# Patient Record
Sex: MALE | Race: WHITE | NOT HISPANIC OR LATINO | URBAN - METROPOLITAN AREA
[De-identification: names, ages, dates, MRNs, and addresses within clinical notes are randomized per-mention and may not be internally consistent; named-entity substitution may affect disease eponyms.]

---

## 2024-10-24 ENCOUNTER — OFFICE VISIT (OUTPATIENT)
Dept: URGENT CARE | Facility: CLINIC | Age: 34
End: 2024-10-24
Payer: COMMERCIAL

## 2024-10-24 VITALS
TEMPERATURE: 98.3 F | HEART RATE: 77 BPM | WEIGHT: 184 LBS | DIASTOLIC BLOOD PRESSURE: 85 MMHG | SYSTOLIC BLOOD PRESSURE: 123 MMHG | OXYGEN SATURATION: 98 % | RESPIRATION RATE: 18 BRPM

## 2024-10-24 DIAGNOSIS — A08.4 VIRAL GASTROENTERITIS: Primary | ICD-10-CM

## 2024-10-24 PROCEDURE — 87811 SARS-COV-2 COVID19 W/OPTIC: CPT | Performed by: PHYSICIAN ASSISTANT

## 2024-10-24 PROCEDURE — S9083 URGENT CARE CENTER GLOBAL: HCPCS | Performed by: PHYSICIAN ASSISTANT

## 2024-10-24 PROCEDURE — G0383 LEV 4 HOSP TYPE B ED VISIT: HCPCS | Performed by: PHYSICIAN ASSISTANT

## 2024-10-24 RX ORDER — ROFLUMILAST 3 MG/G
CREAM TOPICAL
COMMUNITY
Start: 2024-08-27

## 2024-10-24 NOTE — PROGRESS NOTES
[facility]        NAME: Elmer Carrero is a 34 y.o. male  : 1990    MRN: [id number]  DATE: 2024  TIME: 6:37 PM    Assessment and Plan   Viral gastroenteritis [A08.4]  1. Viral gastroenteritis  Poct Covid 19 Rapid Antigen Test            Patient Instructions       Your rapid Covid was negative  Continue with Pepto Bis mal as needed  Increase fluid intake and remain well hydrated  Probiotics  BRAT diet     Follow up with PCP in 3-5 days.  Proceed to  ER if symptoms worsen.      Chief Complaint     Chief Complaint   Patient presents with    Cold Like Symptoms     Vomiting. Diarrhea, 2 days ao. Yesterday runny nose, Body aches, Headache.          History of Present Illness       Symptoms of URI associated with one-time vomiting and diarrhea which has resolved as per patient.  Patient use Pepto-Bismol.    URI   This is a new problem. The current episode started in the past 7 days. The problem has been gradually worsening. There has been no fever. Associated symptoms include congestion, coughing, diarrhea, headaches, rhinorrhea and vomiting. Pertinent negatives include no abdominal pain, nausea, plugged ear sensation, sinus pain, sore throat or wheezing. He has tried nothing for the symptoms. The treatment provided no relief.       Review of Systems   Review of Systems   Constitutional:  Positive for fatigue. Negative for activity change, appetite change, chills and fever.   HENT:  Positive for congestion and rhinorrhea. Negative for sinus pain and sore throat.    Respiratory:  Positive for cough. Negative for wheezing.    Gastrointestinal:  Positive for diarrhea and vomiting. Negative for abdominal pain and nausea.   Neurological:  Positive for headaches. Negative for weakness.         Current Medications       Current Outpatient Medications:     Zoryve 0.3 % CREA, apply by topical route every day to the affected area(s) (Patient not taking: Reported on 10/24/2024), Disp: , Rfl:     Current  Allergies     Allergies as of 10/24/2024 - Reviewed 10/24/2024   Allergen Reaction Noted    Bee venom Swelling 10/24/2024    Shellfish-derived products - food allergy Rash 10/24/2024            The following portions of the patient's history were reviewed and updated as appropriate: allergies, current medications, past family history, past medical history, past social history, past surgical history and problem list.     History reviewed. No pertinent past medical history.    History reviewed. No pertinent surgical history.    History reviewed. No pertinent family history.      Medications have been verified.        Objective   /85   Pulse 77   Temp 98.3 °F (36.8 °C)   Resp 18   Wt 83.5 kg (184 lb)   SpO2 98%        Physical Exam     Physical Exam  Vitals and nursing note reviewed.   Constitutional:       General: He is not in acute distress.     Appearance: Normal appearance. He is normal weight. He is not ill-appearing, toxic-appearing or diaphoretic.   HENT:      Right Ear: Tympanic membrane, ear canal and external ear normal.      Left Ear: Tympanic membrane, ear canal and external ear normal.      Nose: Congestion and rhinorrhea present.      Mouth/Throat:      Mouth: Mucous membranes are moist.      Pharynx: Oropharynx is clear. No oropharyngeal exudate.      Comments: Hyperemic posterior throat with clear postnasal drip.  Eyes:      General: No scleral icterus.     Extraocular Movements: Extraocular movements intact.      Conjunctiva/sclera: Conjunctivae normal.      Pupils: Pupils are equal, round, and reactive to light.   Cardiovascular:      Rate and Rhythm: Normal rate and regular rhythm.      Pulses: Normal pulses.      Heart sounds: Normal heart sounds.   Pulmonary:      Effort: Pulmonary effort is normal. No respiratory distress.      Breath sounds: Normal breath sounds.   Abdominal:      General: Abdomen is flat. Bowel sounds are normal.      Palpations: Abdomen is soft. There is no mass.       Tenderness: There is no abdominal tenderness. There is no guarding or rebound.   Musculoskeletal:         General: Normal range of motion.      Cervical back: Normal range of motion and neck supple. No tenderness.   Lymphadenopathy:      Cervical: No cervical adenopathy.   Skin:     General: Skin is warm and dry.      Findings: No rash.   Neurological:      General: No focal deficit present.      Mental Status: He is alert and oriented to person, place, and time.      Coordination: Coordination normal.      Gait: Gait normal.   Psychiatric:         Mood and Affect: Mood normal.         Behavior: Behavior normal.         Thought Content: Thought content normal.         Judgment: Judgment normal.

## 2024-10-24 NOTE — LETTER
October 24, 2024     Patient: Elmer Carrero   YOB: 1990   Date of Visit: 10/24/2024       To Whom it May Concern:    Elmer Carrero was seen in my clinic on 10/24/2024. He may return to work on 10/25/2024 .    If you have any questions or concerns, please don't hesitate to call.         Sincerely,          Erickson Pearce PA-C        CC:   No Recipients

## 2024-10-24 NOTE — PATIENT INSTRUCTIONS
"Patient Education    Your rapid Covid was negative  Continue with Pepto Bismal as needed  Increase fluid intake and remain well hydrated  Probiotics  BRAT diet     Follow up with PCP in 3-5 days.  Proceed to  ER if symptoms worsen.     Viral gastroenteritis in adults   The Basics   Written by the doctors and editors at [facility]   What is viral gastroenteritis? -- Viral gastroenteritis is an infection that can cause diarrhea and vomiting. It happens when a person's stomach and intestines get infected with a virus (figure 1). One of the most common causes of gastroenteritis is norovirus. But other viruses can cause it, too.  People can get viral gastroenteritis if they:   Touch an infected person or a surface with the virus on it, and then don't wash their hands   Eat foods or drink liquids with the virus in them. If people with the virus don't wash their hands, they can spread it to food or liquids they touch.  What are the symptoms of viral gastroenteritis? -- The infection causes diarrhea and vomiting. People can have either diarrhea or vomiting, or both. These symptoms usually start suddenly, and can be severe.  Viral gastroenteritis can also cause:   Fever   Headache or muscle aches   Belly pain or cramping   Loss of appetite  If you have a lot of diarrhea and vomiting, your body can lose too much water. This is known as \"dehydration.\" Dehydration can make you feel thirsty, tired, dizzy, or even confused. It can also make your urine look dark yellow.  Severe dehydration can be life-threatening. Older people are more likely to get severe dehydration.  Will I need tests? -- Not usually. Your doctor or nurse should be able to tell if you have viral gastroenteritis by learning about your symptoms and doing an exam. But the doctor or nurse might do tests to check for dehydration or to see which virus is causing the infection. These tests can include:   Blood tests   Urine tests   Tests on a sample of bowel " "movement  Is there anything I can do on my own to feel better? -- Yes. You need to replace the body's fluids that are lost through vomiting and diarrhea:   Drink fluids when you are able. It might help to take small sips every 15 to 30 minutes. Try to drink more as you start to feel better.   When you have a lot of vomiting or diarrhea, your body loses both water and salt. Drinking fluids that contain some salt can help replace what your body has lost. Examples include \"oral rehydration solutions,\" sports drinks, and broth. If you drink a lot of plain water, make sure you are also eating. This will help your body keep the right salt and water balance.   Avoid drinks with a lot of sugar, like juice or soda. Avoid alcohol, too.   Eat when you are able. If you can keep food down, it's best to eat lean meats, fruits, vegetables, and whole-grain breads and cereals. Avoid eating foods with a lot of fat or sugar, which can make symptoms worse.  If you are an adult younger than 65 and you have a new bout of diarrhea, and no fever and no blood in your bowel movements, you can take medicine to stop diarrhea such as loperamide (brand name: Imodium) for 1 to 2 days. But if you are older than 65, have a fever, or have blood in your bowel movements, do not take these medicines without checking with your doctor.  If you have diabetes, you might need to check your blood sugar more often until you feel better. Ask your doctor or nurse about this.  Should I call the doctor or nurse? -- Call the doctor or nurse if you:   Have any symptoms of dehydration, like feeling very tired, thirsty, dizzy, or confused   Have diarrhea or vomiting that lasts longer than a few days   Vomit up blood, have bloody diarrhea, or have severe belly pain   Haven't been able to drink anything for many hours   Haven't needed to urinate in the past 6 to 8 hours (during the day)  How is viral gastroenteritis treated? -- Most people do not need any treatment, " "because their symptoms will get better on their own. But people with severe dehydration might need treatment in the hospital. This involves getting fluids through a thin tube that goes into a vein, called an \"IV.\"  Doctors do not treat viral gastroenteritis with antibiotics. That's because antibiotics treat infections that are caused by bacteria, not viruses.  Can viral gastroenteritis be prevented? -- Sometimes. To lower the chance of getting or spreading the infection, wash your hands well with soap and water:   After you use the bathroom   Before you eat   Before you prepare food  Also, if you are caring for a child in diapers, wash your hands well after changing diapers. Do not change diapers near where you cook or eat food.  All topics are updated as new evidence becomes available and our peer review process is complete.  This topic retrieved from [employer] on: Mar 06, 2024.  Topic 23789 Version 17.0  Release: 32.2.4 - C32.64  © 2024 UpToDate, Inc. and/or its affiliates. All rights reserved.  figure 1: Digestive system     This drawing shows the organs in the body that process food. Together, these organs are called the \"digestive system\" or \"digestive tract.\" As food travels through this system, the body absorbs nutrients and water.  Graphic 34621 Version 5.0  Consumer Information Use and Disclaimer   Disclaimer: This generalized information is a limited summary of diagnosis, treatment, and/or medication information. It is not meant to be comprehensive and should be used as a tool to help the user understand and/or assess potential diagnostic and treatment options. It does NOT include all information about conditions, treatments, medications, side effects, or risks that may apply to a specific patient. It is not intended to be medical advice or a substitute for the medical advice, diagnosis, or treatment of a health care provider based on the health care provider's examination and assessment of a patient's " specific and unique circumstances. Patients must speak with a health care provider for complete information about their health, medical questions, and treatment options, including any risks or benefits regarding use of medications. This information does not endorse any treatments or medications as safe, effective, or approved for treating a specific patient. UpToDate, Inc. and its affiliates disclaim any warranty or liability relating to this information or the use thereof.The use of this information is governed by the Terms of Use, available at https://www.woltersPalringouwer.com/en/know/clinical-effectiveness-terms. 2024© UpToDate, Inc. and its affiliates and/or licensors. All rights reserved.  Copyright   © 2024 UpToDate, Inc. and/or its affiliates. All rights reserved.

## 2024-10-28 LAB
SARS-COV-2 AG UPPER RESP QL IA: NEGATIVE
VALID CONTROL: NORMAL

## 2024-11-12 ENCOUNTER — HOSPITAL ENCOUNTER (EMERGENCY)
Facility: HOSPITAL | Age: 34
Discharge: HOME | End: 2024-11-12
Attending: EMERGENCY MEDICINE
Payer: OTHER MISCELLANEOUS

## 2024-11-12 ENCOUNTER — OFFICE VISIT (OUTPATIENT)
Dept: URGENT CARE | Facility: CLINIC | Age: 34
End: 2024-11-12
Payer: COMMERCIAL

## 2024-11-12 VITALS
DIASTOLIC BLOOD PRESSURE: 76 MMHG | RESPIRATION RATE: 24 BRPM | TEMPERATURE: 96.4 F | OXYGEN SATURATION: 97 % | SYSTOLIC BLOOD PRESSURE: 144 MMHG | HEART RATE: 89 BPM

## 2024-11-12 VITALS
HEART RATE: 85 BPM | OXYGEN SATURATION: 96 % | SYSTOLIC BLOOD PRESSURE: 151 MMHG | RESPIRATION RATE: 20 BRPM | WEIGHT: 184 LBS | TEMPERATURE: 97.4 F | DIASTOLIC BLOOD PRESSURE: 86 MMHG

## 2024-11-12 DIAGNOSIS — T78.40XA ALLERGIC REACTION, INITIAL ENCOUNTER: Primary | ICD-10-CM

## 2024-11-12 DIAGNOSIS — R06.2 WHEEZING: ICD-10-CM

## 2024-11-12 PROCEDURE — 96372 THER/PROPH/DIAG INJ SC/IM: CPT | Performed by: PHYSICIAN ASSISTANT

## 2024-11-12 PROCEDURE — G0381 LEV 2 HOSP TYPE B ED VISIT: HCPCS | Performed by: PHYSICIAN ASSISTANT

## 2024-11-12 PROCEDURE — S9083 URGENT CARE CENTER GLOBAL: HCPCS | Performed by: PHYSICIAN ASSISTANT

## 2024-11-12 PROCEDURE — 99284 EMERGENCY DEPT VISIT MOD MDM: CPT | Performed by: EMERGENCY MEDICINE

## 2024-11-12 PROCEDURE — 99284 EMERGENCY DEPT VISIT MOD MDM: CPT

## 2024-11-12 RX ORDER — PREDNISONE 20 MG/1
40 TABLET ORAL DAILY
Qty: 10 TABLET | Refills: 0 | Status: SHIPPED | OUTPATIENT
Start: 2024-11-13

## 2024-11-12 RX ORDER — EPINEPHRINE 0.3 MG/.3ML
0.3 INJECTION SUBCUTANEOUS ONCE
Qty: 0.6 ML | Refills: 0 | Status: SHIPPED | OUTPATIENT
Start: 2024-11-12 | End: 2024-11-12

## 2024-11-12 RX ORDER — DIPHENHYDRAMINE HCL 25 MG
50 TABLET ORAL ONCE
Status: COMPLETED | OUTPATIENT
Start: 2024-11-12 | End: 2024-11-12

## 2024-11-12 RX ORDER — DEXAMETHASONE SODIUM PHOSPHATE 4 MG/ML
8 INJECTION, SOLUTION INTRA-ARTICULAR; INTRALESIONAL; INTRAMUSCULAR; INTRAVENOUS; SOFT TISSUE ONCE
Status: COMPLETED | OUTPATIENT
Start: 2024-11-12 | End: 2024-11-12

## 2024-11-12 RX ADMIN — Medication 50 MG: at 09:07

## 2024-11-12 RX ADMIN — DEXAMETHASONE SODIUM PHOSPHATE 8 MG: 4 INJECTION, SOLUTION INTRA-ARTICULAR; INTRALESIONAL; INTRAMUSCULAR; INTRAVENOUS; SOFT TISSUE at 09:07

## 2024-11-12 NOTE — PATIENT INSTRUCTIONS
EMS was alerted patient appears to be stable.  Benadryl and dexamethasone given in the office.  Recommended patient be seen at the ER for further evaluation and monitoring.    Follow up with PCP in 3-5 days.  Proceed to  ER if symptoms worsen.    If tests are performed, our office will contact you with results only if changes need to made to the care plan discussed with you at the visit. You can review your full results on [facility].

## 2024-11-12 NOTE — PROGRESS NOTES
[facility]        NAME: Elmer Carrero is a 34 y.o. male  : 1990    MRN: [id number]  DATE: 2024  TIME: 9:13 AM    Assessment and Plan   Allergic reaction, initial encounter [T78.40XA]  1. Allergic reaction, initial encounter  dexamethasone (DECADRON) injection 8 mg    diphenhydrAMINE (BENADRYL) tablet 50 mg    Transfer to other facility      2. Wheezing              Patient Instructions     Patient Instructions   EMS was alerted patient appears to be stable.  Benadryl and dexamethasone given in the office.  Recommended patient be seen at the ER for further evaluation and monitoring.    Follow up with PCP in 3-5 days.  Proceed to  ER if symptoms worsen.    If tests are performed, our office will contact you with results only if changes need to made to the care plan discussed with you at the visit. [facility]        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 Was only in the room with it. Only had rash last time. Can't catch breath light headed, dizzy.         History of Present Illness       Presents today for evaluation of an allergic reaction.  He states he has had a rash from shellfish before but today when they were making crab cakes at work he started to have difficulty breathing and feeling tightness in his chest.  Did not take any medication.    Allergic Reaction  This is a new problem. The current episode started today. The problem is unchanged. The problem is moderate. The patient was exposed to shellfish. The time of exposure was just prior to onset. The exposure occurred at Work. Associated symptoms include difficulty breathing and wheezing. Pertinent negatives include no globus sensation, hyperventilation, itching, rash or trouble swallowing. There is no swelling present. Past treatments include nothing.       Review of Systems   Review of Systems    HENT:  Negative for trouble swallowing.    Respiratory:  Positive for wheezing.    Skin:  Negative for itching and rash.   All other systems reviewed and are negative.        Current Medications       Current Outpatient Medications:     Zoryve 0.3 % CREA, , Disp: , Rfl:   No current facility-administered medications for this visit.    Current Allergies     Allergies as of 11/12/2024 - Reviewed 11/12/2024   Allergen Reaction Noted    Bee venom Swelling 10/24/2024    Shellfish-derived products - food allergy Rash 10/24/2024            The following portions of the patient's history were reviewed and updated as appropriate: allergies, current medications, past family history, past medical history, past social history, past surgical history and problem list.     History reviewed. No pertinent past medical history.    History reviewed. No pertinent surgical history.    History reviewed. No pertinent family history.      Medications have been verified.        Objective   /76   Pulse 89   Temp (!) 96.4 °F (35.8 °C)   Resp (!) 24   SpO2 97%        Physical Exam     Physical Exam  Vitals and nursing note reviewed.   Constitutional:       Appearance: Normal appearance.   HENT:      Mouth/Throat:      Mouth: Mucous membranes are moist.      Pharynx: Oropharynx is clear.      Comments: No obvious swelling.  Pulmonary:      Effort: Pulmonary effort is normal.      Breath sounds: Wheezing (Significant in the upper lung fields) present.      Comments: Breathing appears to be slightly labored.  Skin:     General: Skin is warm.   Neurological:      General: No focal deficit present.      Mental Status: He is alert and oriented to person, place, and time.   Psychiatric:         Mood and Affect: Mood normal.         Behavior: Behavior normal.

## 2024-11-12 NOTE — ED PROVIDER NOTES
Time reflects when diagnosis was documented in both MDM as applicable and the Disposition within this note       Time User Action Codes Description Comment    11/12/2024  9:45 AM Mat Jeff Add [T78.40XA] Allergic reaction, initial encounter           ED Disposition       ED Disposition   Discharge    Condition   Stable    Date/Time   Tue Nov 12, 2024  9:59 AM    Comment   Elmer Carrero discharge to home/self care.                   Assessment & Plan       Medical Decision Making  Problems Addressed:  Allergic reaction, initial encounter: complicated acute illness or injury with systemic symptoms that poses a threat to life or bodily functions     Details: Ddx includes allergic reaction, anaphylaxis, angioedema, CHF, asthma, etc.     Risk  Prescription drug management.             Medications - No data to display    ED Risk Strat Scores                                               History of Present Illness       Chief Complaint   Patient presents with    Allergic Reaction     Pt arrived via EMS from . Pt is allergic to seafood/shellfish. Pt is a  and was making crab cakes. Pt began having trouble breathing. Pt received benadryl, decadron and albuterol en route.        Past Medical History:   Diagnosis Date    IBS (irritable bowel syndrome)     Testicular cancer (HCC)       Past Surgical History:   Procedure Laterality Date    SURGERY SCROTAL / TESTICULAR Left     testicular removal      History reviewed. No pertinent family history.   Social History     Tobacco Use    Smoking status: Every Day     Types: Cigarettes     Start date: 2012    Smokeless tobacco: Never   Vaping Use    Vaping status: Never Used   Substance Use Topics    Alcohol use: Not Currently    Drug use: Not Currently      E-Cigarette/Vaping    E-Cigarette Use Never User       E-Cigarette/Vaping Substances      I have reviewed and agree with the history as documented.     33 yo male with h/o allergy to seafood/shellfish who presents to ED  with acute allergic reaction. 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 Went to  and received IM decadron and PO benadryl. EMS administered duoneb for wheezing which is now resolved and pt notes resolution of all sxs at the time of ED arrival. No h/o anaphylaxis. No prior epi use or epi today. No abd pain, vomiting or diarrhea. No rash or hives.         Review of Systems   Respiratory:  Positive for shortness of breath and wheezing.            Objective       ED Triage Vitals [11/12/24 0943]   Temperature Pulse Blood Pressure Respirations SpO2 Patient Position - Orthostatic VS   (!) 97.4 °F (36.3 °C) 85 151/86 20 96 % --      Temp Source Heart Rate Source BP Location FiO2 (%) Pain Score    Oral -- -- -- --      Vitals      Date and Time Temp Pulse SpO2 Resp BP Pain Score FACES Pain Rating User   11/12/24 0943 97.4 °F (36.3 °C) 85 96 % 20 151/86 -- -- EN            Physical Exam  Vitals and nursing note reviewed.   Constitutional:       General: He is not in acute distress.     Appearance: Normal appearance. He is well-developed. He is not ill-appearing, toxic-appearing or diaphoretic.   HENT:      Head: Normocephalic and atraumatic.      Mouth/Throat:      Mouth: Mucous membranes are moist.      Pharynx: Oropharynx is clear.   Eyes:      Conjunctiva/sclera: Conjunctivae normal.      Pupils: Pupils are equal, round, and reactive to light.   Neck:      Vascular: No JVD.   Cardiovascular:      Rate and Rhythm: Normal rate and regular rhythm.      Pulses: Normal pulses.      Heart sounds: Normal heart sounds. No murmur heard.     No friction rub. No gallop.   Pulmonary:      Effort: Pulmonary effort is normal. No respiratory distress.      Breath sounds: Normal breath sounds. No stridor. No wheezing or rales.   Abdominal:      General: There is no distension.      Palpations: Abdomen is soft.      Tenderness: There is no abdominal tenderness. There is  no guarding or rebound.   Musculoskeletal:         General: No swelling, tenderness, deformity or signs of injury. Normal range of motion.      Cervical back: Normal range of motion and neck supple. No rigidity.   Skin:     General: Skin is warm and dry.      Capillary Refill: Capillary refill takes less than 2 seconds.      Coloration: Skin is not jaundiced or pale.      Findings: No bruising, erythema or rash.   Neurological:      General: No focal deficit present.      Mental Status: He is alert and oriented to person, place, and time.      Cranial Nerves: No cranial nerve deficit.      Sensory: No sensory deficit.      Motor: No weakness or abnormal muscle tone.      Coordination: Coordination normal.      Gait: Gait normal.         Results Reviewed       None            No orders to display       Procedures    ED Medication and Procedure Management   Prior to Admission Medications   Prescriptions Last Dose Informant Patient Reported? Taking?   Zoryve 0.3 % CREA   Yes No      Facility-Administered Medications Last Administration Doses Remaining   dexamethasone (DECADRON) injection 8 mg 11/12/2024  9:07 AM 0   diphenhydrAMINE (BENADRYL) tablet 50 mg 11/12/2024  9:07 AM 0        Discharge Medication List as of 11/12/2024  9:59 AM        START taking these medications    Details   predniSONE 20 mg tablet Take 2 tablets (40 mg total) by mouth daily Do not start before November 13, 2024., Starting Wed 11/13/2024, Normal           CONTINUE these medications which have NOT CHANGED    Details   Zoryve 0.3 % CREA Historical Med             ED SEPSIS DOCUMENTATION   Time reflects when diagnosis was documented in both MDM as applicable and the Disposition within this note       Time User Action Codes Description Comment    11/12/2024  9:45 AM Mat Jeff Add [T78.40XA] Allergic reaction, initial encounter                  Mat Jeff MD  11/12/24 9512

## 2024-11-12 NOTE — Clinical Note
Elmer Carrero was seen and treated in our emergency department on 11/12/2024.                Diagnosis:     Elmer  may return to work on return date.    He may return on this date: 11/13/2024         If you have any questions or concerns, please don't hesitate to call.      Mat Jeff MD    ______________________________           _______________          _______________  Hospital Representative                              Date                                Time

## 2024-11-25 ENCOUNTER — HOSPITAL ENCOUNTER (EMERGENCY)
Facility: HOSPITAL | Age: 34
Discharge: HOME | End: 2024-11-25
Attending: EMERGENCY MEDICINE
Payer: OTHER MISCELLANEOUS

## 2024-11-25 VITALS
RESPIRATION RATE: 22 BRPM | TEMPERATURE: 98.7 F | OXYGEN SATURATION: 97 % | HEART RATE: 65 BPM | DIASTOLIC BLOOD PRESSURE: 60 MMHG | SYSTOLIC BLOOD PRESSURE: 112 MMHG

## 2024-11-25 DIAGNOSIS — T78.40XA ALLERGIC REACTION: Primary | ICD-10-CM

## 2024-11-25 PROCEDURE — 99282 EMERGENCY DEPT VISIT SF MDM: CPT

## 2024-11-25 PROCEDURE — 99284 EMERGENCY DEPT VISIT MOD MDM: CPT | Performed by: PHYSICIAN ASSISTANT

## 2024-11-25 RX ORDER — PREDNISONE 50 MG/1
50 TABLET ORAL DAILY
Qty: 5 TABLET | Refills: 0 | Status: SHIPPED | OUTPATIENT
Start: 2024-11-25 | End: 2024-12-05

## 2024-11-25 RX ORDER — CETIRIZINE HYDROCHLORIDE 10 MG/1
10 TABLET ORAL DAILY
Qty: 10 TABLET | Refills: 0 | Status: SHIPPED | OUTPATIENT
Start: 2024-11-25

## 2024-11-25 RX ORDER — FAMOTIDINE 20 MG/1
20 TABLET, FILM COATED ORAL ONCE
Status: COMPLETED | OUTPATIENT
Start: 2024-11-25 | End: 2024-11-25

## 2024-11-25 RX ORDER — EPINEPHRINE 0.3 MG/.3ML
0.3 INJECTION SUBCUTANEOUS ONCE
Qty: 0.6 ML | Refills: 0 | Status: SHIPPED | OUTPATIENT
Start: 2024-11-25 | End: 2024-12-03 | Stop reason: SDUPTHER

## 2024-11-25 RX ADMIN — PREDNISONE 50 MG: 20 TABLET ORAL at 11:21

## 2024-11-25 RX ADMIN — FAMOTIDINE 20 MG: 20 TABLET, FILM COATED ORAL at 11:21

## 2024-11-25 NOTE — Clinical Note
Elmer Carrero was seen and treated in our emergency department on 11/25/2024.    No restrictions            Diagnosis:     Elmer  may return to work on return date.    He may return on this date: 11/27/2024         If you have any questions or concerns, please don't hesitate to call.      Julie Lynn Gutzweiler, PA-C    ______________________________           _______________          _______________  Hospital Representative                              Date                                Time

## 2024-11-25 NOTE — ED PROVIDER NOTES
Time reflects when diagnosis was documented in both MDM as applicable and the Disposition within this note       Time User Action Codes Description Comment    11/25/2024 11:39 AM Gutzweiler, Julie Add [T78.40XA] Allergic reaction     11/25/2024 11:39 AM Gutzweiler, Julie Modify [T78.40XA] Allergic reaction Shell fish          ED Disposition       ED Disposition   Discharge    Condition   Stable    Date/Time   Mon Nov 25, 2024 11:39 AM    Comment   Elmer Carrero discharge to home/self care.                   Assessment & Plan       Medical Decision Making  This 34-year-old male presents emergency room by frederick.  He works in the kitchen at a restaurant.  They were frying some shrimp and he suddenly felt like he could not catch his breath.  He felt like his voice was hoarse.  He states that he was guppy breathing.  He had to leave the kitchen when he got outside he was feeling better.  He took 50 mg of Benadryl and states he felt a little lightheaded shortly after taking the medications.  He called the emergency squad instead of driving his car.  Presently he is feeling better.  He denies any shortness of breath at this time.  He is able to handle his secretions.  He is not having any difficulty swallowing.  He denies any wheezing.  He denies any chest pain.  He denies a sore throat.  He denies any skin rash or itching.  He states that the paramedic told him he was having a panic attack.  He had a similar episode last week when he was working with shellfish.  Treated in the emergency room and released.  He was given an EpiPen at that time that he never got filled.    Upon physical exam this 34-year-old male is alert oriented x 3.  He is in no acute distress.  He speaking in full sentences.  Handling his secretions.  His airway is patent with no edema.  There is no erythema present.  He has no rhinorrhea or postnasal drip.  His neck is supple upon palpation without lymphadenopathy or nuchal rigidity.  His lungs are  clear to auscultation.  His heart is a regular rate and rhythm without murmur.  His abdomen is soft nondistended nontender without guarding or rebound.  He has no dermatitis identified on exposed skin.    Patient will be placed on the monitor.  He will be observed in the emergency room for 90 minutes.  He will be given a dose of 50 mg of prednisone along with the 50 mg Benadryl that he already took.  He also will be given 20 mg of Pepcid.  At this time he does not require IM epinephrine.    Patient was observed for 2 hours.  He is asymptomatic.  He has no dermatitis.  He has no angioedema.  He has no swelling of his posterior pharynx.  His airway is intact.  He is handling his secretions.  Speaking in full sentences.  His lungs are clear to auscultation.    Impression  Allergic reaction-seafood    Plan  Patient's to get his EpiPen filled.  I wrote for a refill prescription so he has to at home.  Instructed him on the use if his symptoms return.  He is to avoid working around shellfish.  He is going to follow-up with his family physician for recheck exam and to be referred to an allergist.  Patient understands the use of the EpiPen.  I reviewed this with him in the emergency room today.  If he has repeat symptoms, he is to use the EpiPen IM and come immediately to the emergency room either with 911 or via car as a passenger.  He should not be driving after the epinephrine administration.      Risk  OTC drugs.  Prescription drug management.        ED Course as of 11/25/24 1614   Mon Nov 25, 2024   1138 Recheck patient.  Patient feels better.  Plan DC home.  Prednisone x 5 days.  Zyrtec 10 mg per day x 5 days.  Benadryl 25 mg every 6 hours as needed for breakthrough pain.       Medications   predniSONE tablet 50 mg (50 mg Oral Given 11/25/24 1121)   famotidine (PEPCID) tablet 20 mg (20 mg Oral Given 11/25/24 1121)       ED Risk Strat Scores                                               History of Present Illness  "      Chief Complaint   Patient presents with    Allergic Reaction     BIBA from work. Pt. States: \"He felt tightness on his throat after going into the kitchen, they were cooking shrimp that time. \" Pt. Self medicated, took Benadryl 50 mg.  Symptoms resolved, breathing is better.       Past Medical History:   Diagnosis Date    IBS (irritable bowel syndrome)     Testicular cancer (HCC)       Past Surgical History:   Procedure Laterality Date    SURGERY SCROTAL / TESTICULAR Left     testicular removal      No family history on file.   Social History     Tobacco Use    Smoking status: Every Day     Types: Cigarettes     Start date: 2012    Smokeless tobacco: Never   Vaping Use    Vaping status: Never Used   Substance Use Topics    Alcohol use: Not Currently    Drug use: Not Currently      E-Cigarette/Vaping    E-Cigarette Use Never User       E-Cigarette/Vaping Substances      I have reviewed and agree with the history as documented.       History provided by:  Patient  Allergic Reaction  Presenting symptoms: difficulty breathing (1) and swelling    Presenting symptoms: no difficulty swallowing, no itching, no rash and no wheezing    Difficulty breathing:     Severity:  Moderate    Onset quality:  Gradual    Duration:  1 hour    Timing:  Intermittent    Progression:  Resolved  Severity:  Mild  Duration:  1 hour  Prior episodes: Shellfish allergy test patient seen on November 12, 2024 for similar episode.  Context comment:  Shellfish  Relieved by:  Antihistamines (50 mg of Benadryl and leaving the kitchen.)  Worsened by:  Nothing      Review of Systems   Constitutional:  Positive for activity change. Negative for appetite change, chills, fatigue and fever.   HENT:  Positive for voice change. Negative for congestion, postnasal drip, rhinorrhea, sore throat and trouble swallowing.    Respiratory:  Positive for shortness of breath. Negative for chest tightness and wheezing.    Cardiovascular:  Negative for chest pain and " palpitations.   Gastrointestinal:  Negative for abdominal pain, nausea and vomiting.   Skin:  Negative for color change, itching and rash.   Psychiatric/Behavioral:  Negative for confusion.    All other systems reviewed and are negative.          Objective       ED Triage Vitals   Temperature Pulse Blood Pressure Respirations SpO2 Patient Position - Orthostatic VS   11/25/24 0959 11/25/24 0959 11/25/24 0959 11/25/24 0959 11/25/24 1000 11/25/24 1000   98 °F (36.7 °C) 93 135/93 12 96 % Lying      Temp Source Heart Rate Source BP Location FiO2 (%) Pain Score    11/25/24 0959 11/25/24 0959 11/25/24 1100 -- --    Oral Monitor Right arm        Vitals      Date and Time Temp Pulse SpO2 Resp BP Pain Score FACES Pain Rating User   11/25/24 1130 -- 65 97 % 22 112/60 -- -- AA   11/25/24 1100 -- 77 98 % 18 136/82 -- -- AA   11/25/24 1000 -- 93 Simultaneous filing. User may not have seen previous data. 96 % Simultaneous filing. User may not have seen previous data. 12 135/93 -- -- AA   11/25/24 1000 98.7 °F (37.1 °C) -- -- -- -- -- -- LM   11/25/24 0959 98 °F (36.7 °C) 93 -- 12 135/93 -- -- LM            Physical Exam  Vitals and nursing note reviewed.   Constitutional:       General: He is not in acute distress.     Appearance: Normal appearance. He is normal weight. He is not ill-appearing, toxic-appearing or diaphoretic.   HENT:      Head: Normocephalic and atraumatic.      Right Ear: External ear normal.      Left Ear: External ear normal.      Nose: Nose normal.      Mouth/Throat:      Pharynx: No oropharyngeal exudate or posterior oropharyngeal erythema.      Comments: No edema, airway patent  Eyes:      General:         Right eye: No discharge.         Left eye: No discharge.      Conjunctiva/sclera: Conjunctivae normal.   Cardiovascular:      Rate and Rhythm: Normal rate and regular rhythm.      Heart sounds: Normal heart sounds.   Pulmonary:      Effort: Pulmonary effort is normal.      Breath sounds: Normal breath  sounds.   Abdominal:      General: There is no distension.      Palpations: Abdomen is soft.      Tenderness: There is no abdominal tenderness. There is no guarding or rebound.   Musculoskeletal:      Cervical back: Neck supple. No rigidity.      Right lower leg: No edema.      Left lower leg: No edema.   Lymphadenopathy:      Cervical: No cervical adenopathy.   Skin:     General: Skin is warm.      Capillary Refill: Capillary refill takes less than 2 seconds.      Findings: No rash.   Neurological:      Mental Status: He is alert and oriented to person, place, and time.   Psychiatric:         Mood and Affect: Mood normal.         Behavior: Behavior normal.         Thought Content: Thought content normal.         Judgment: Judgment normal.         Results Reviewed       None            No orders to display       Procedures    ED Medication and Procedure Management   Prior to Admission Medications   Prescriptions Last Dose Informant Patient Reported? Taking?   EPINEPHrine (EPIPEN) 0.3 mg/0.3 mL SOAJ   No No   Sig: Inject 0.3 mL (0.3 mg total) into a muscle once for 1 dose   Zoryve 0.3 % CREA   Yes No   predniSONE 20 mg tablet   No No   Sig: Take 2 tablets (40 mg total) by mouth daily Do not start before November 13, 2024.      Facility-Administered Medications: None     Discharge Medication List as of 11/25/2024 11:42 AM        START taking these medications    Details   cetirizine (ZyrTEC) 10 mg tablet Take 1 tablet (10 mg total) by mouth daily, Starting Mon 11/25/2024, Normal      !! predniSONE 50 mg tablet Take 1 tablet (50 mg total) by mouth daily for 10 days, Starting Mon 11/25/2024, Until u 12/5/2024, Normal       !! - Potential duplicate medications found. Please discuss with provider.        CONTINUE these medications which have NOT CHANGED    Details   EPINEPHrine (EPIPEN) 0.3 mg/0.3 mL SOAJ Inject 0.3 mL (0.3 mg total) into a muscle once for 1 dose, Starting Tue 11/12/2024, Normal      !! predniSONE 20 mg  tablet Take 2 tablets (40 mg total) by mouth daily Do not start before November 13, 2024., Starting Wed 11/13/2024, Normal      Zoryve 0.3 % CREA Historical Med       !! - Potential duplicate medications found. Please discuss with provider.        No discharge procedures on file.  ED SEPSIS DOCUMENTATION   Time reflects when diagnosis was documented in both MDM as applicable and the Disposition within this note       Time User Action Codes Description Comment    11/25/2024 11:39 AM Gutzweiler, Julie Add [T78.40XA] Allergic reaction     11/25/2024 11:39 AM Gutzweiler, Julie Modify [T78.40XA] Allergic reaction Shell fish                 Julie Lynn Gutzweiler, PA-C  11/25/24 3729

## 2024-11-25 NOTE — DISCHARGE INSTRUCTIONS
Use Benadryl, 25 mg every 6 hours as needed for itching    Use your Epi Pen as prescribed and come to the ER.

## 2024-11-26 ENCOUNTER — OFFICE VISIT (OUTPATIENT)
Dept: FAMILY MEDICINE CLINIC | Facility: CLINIC | Age: 34
End: 2024-11-26
Payer: OTHER MISCELLANEOUS

## 2024-11-26 VITALS
SYSTOLIC BLOOD PRESSURE: 126 MMHG | RESPIRATION RATE: 15 BRPM | TEMPERATURE: 98.2 F | WEIGHT: 185 LBS | BODY MASS INDEX: 29.03 KG/M2 | HEIGHT: 67 IN | DIASTOLIC BLOOD PRESSURE: 78 MMHG | OXYGEN SATURATION: 98 % | HEART RATE: 60 BPM

## 2024-11-26 DIAGNOSIS — T78.40XA WHEEZING DUE TO ALLERGY: ICD-10-CM

## 2024-11-26 DIAGNOSIS — R06.2 WHEEZING DUE TO ALLERGY: ICD-10-CM

## 2024-11-26 DIAGNOSIS — T78.40XD ALLERGIC REACTION, SUBSEQUENT ENCOUNTER: ICD-10-CM

## 2024-11-26 DIAGNOSIS — Z91.013 SHELLFISH ALLERGY: Primary | ICD-10-CM

## 2024-11-26 PROCEDURE — 99242 OFF/OP CONSLTJ NEW/EST SF 20: CPT

## 2024-11-26 RX ORDER — ALBUTEROL SULFATE 90 UG/1
2 INHALANT RESPIRATORY (INHALATION) EVERY 6 HOURS PRN
Qty: 6.7 G | Refills: 1 | Status: SHIPPED | OUTPATIENT
Start: 2024-11-26

## 2024-11-26 NOTE — LETTER
November 26, 2024     Patient: Elmer Carrero  YOB: 1990  Date of Visit: 11/26/2024      To Whom it May Concern:    Elmer Carrero is under my professional care. Elmer was seen in my office on 11/26/2024. Elmer must not be around (airborne) or in physical contact with any type of seafood/shellfish. Elmer has severe allergic reaction to seafood and shellfish.  Please allow him accommodations.   If you have any questions or concerns, please don't hesitate to call.         Sincerely,          Sonal Naidu PA-C        CC: No Recipients

## 2024-11-26 NOTE — PROGRESS NOTES
Name: Elmer Carrero      : 1990      MRN: [id number]  Encounter Provider: Sonal Naidu PA-C  Encounter Date: 2024   Encounter department: [facility] CARE  :  Assessment & Plan  Allergic reaction, initial encounter  -Was seen on 24 and 24 in the ED for allergic reaction to shellfish.  -Was given benadryl and prednisone both times which both helped him  Orders:    Ambulatory Referral to Family Practice    Ambulatory Referral to Allergy; Future    Shellfish allergy  -Suspected shellfish allergy due to reactions to both shrimp and crab at work  -Will refer to allergist for further testing  -Was prescribed an EpiPen from the ED  -Urged patient to pick that up ASA   Orders:    Ambulatory Referral to Allergy; Future    Wheezing due to allergy  -Patient and his wife both concerned with his wheezing and would like to try a recuse inhaler for when he is SOB  -Sent albuterol for him to use when he is wheezing/feeling symptoms   Orders:    albuterol (Proventil HFA) 90 mcg/act inhaler; Inhale 2 puffs every 6 (six) hours as needed for wheezing         History of Present Illness     Patient is a 34-year-old male who presents today after 2 ED visits due to possible shellfish allergy. 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 He was able to drive himself to the ED and was given benadryl and prednisone for his symptoms which made him feel better. The second time he says that they were frying shrimp next to him and within 10-15 minutes he felt shortness of breath again and felt like his throat was closing on him. He attempted to walk to his car to go to the ED and stated that he couldn't make it and his symptoms were getting worse, so he was taken by ambulance.     He denies any issue with seafood or shellfish in the past and states that he used to  "eat it or touch it with no problem. Today he says he feels a little dizzy/lightheaded, but denies any other acute concerns and denies wheezing, SOB, chest pain.      Review of Systems   Constitutional:  Negative for chills, fatigue and fever.   HENT:  Negative for congestion, ear pain and sore throat.    Eyes:  Negative for pain.   Respiratory:  Negative for cough, chest tightness and shortness of breath.    Cardiovascular:  Negative for chest pain and palpitations.   Gastrointestinal:  Negative for abdominal pain, constipation, diarrhea, nausea and vomiting.   Genitourinary:  Negative for difficulty urinating and dysuria.   Musculoskeletal:  Negative for arthralgias and back pain.   Skin:  Negative for color change and rash.   Allergic/Immunologic: Positive for food allergies.   Neurological:  Positive for dizziness and light-headedness. Negative for syncope and headaches.   All other systems reviewed and are negative.    Current Outpatient Medications on File Prior to Visit   Medication Sig Dispense Refill    cetirizine (ZyrTEC) 10 mg tablet Take 1 tablet (10 mg total) by mouth daily 10 tablet 0    EPINEPHrine (EPIPEN) 0.3 mg/0.3 mL SOAJ Inject 0.3 mL (0.3 mg total) into a muscle once for 1 dose 0.6 mL 0    predniSONE 20 mg tablet Take 2 tablets (40 mg total) by mouth daily Do not start before November 13, 2024. 10 tablet 0    predniSONE 50 mg tablet Take 1 tablet (50 mg total) by mouth daily for 10 days 5 tablet 0    Zoryve 0.3 % CREA       EPINEPHrine (EPIPEN) 0.3 mg/0.3 mL SOAJ Inject 0.3 mL (0.3 mg total) into a muscle once for 1 dose (Patient not taking: Reported on 11/26/2024) 0.6 mL 0     No current facility-administered medications on file prior to visit.         Objective   /78 (BP Location: Left arm, Patient Position: Sitting, Cuff Size: Standard)   Pulse 60   Temp 98.2 °F (36.8 °C) (Tympanic)   Resp 15   Ht 5' 7\" (1.702 m)   Wt 83.9 kg (185 lb)   SpO2 98%   BMI 28.98 kg/m²      Physical " Exam  Vitals and nursing note reviewed.   Constitutional:       General: He is not in acute distress.     Appearance: Normal appearance. He is well-developed.   HENT:      Head: Normocephalic and atraumatic.   Eyes:      Conjunctiva/sclera: Conjunctivae normal.   Cardiovascular:      Rate and Rhythm: Normal rate and regular rhythm.      Heart sounds: Normal heart sounds. No murmur heard.  Pulmonary:      Effort: Pulmonary effort is normal. No respiratory distress.      Breath sounds: Normal breath sounds. No wheezing or rhonchi.   Abdominal:      Palpations: Abdomen is soft.      Tenderness: There is no abdominal tenderness.   Musculoskeletal:         General: No swelling.      Cervical back: Neck supple.   Skin:     General: Skin is warm and dry.      Capillary Refill: Capillary refill takes less than 2 seconds.   Neurological:      Mental Status: He is alert and oriented to person, place, and time.   Psychiatric:         Mood and Affect: Mood normal.         Behavior: Behavior normal.         Thought Content: Thought content normal.         Judgment: Judgment normal.       Administrative Statements   I have spent a total time of 25 minutes in caring for this patient on the day of the visit/encounter including Diagnostic results, Prognosis, Risks and benefits of tx options, Instructions for management, Patient and family education, Importance of tx compliance, Risk factor reductions, Impressions, Counseling / Coordination of care, Documenting in the medical record, Reviewing / ordering tests, medicine, procedures  , and Obtaining or reviewing history  . Topics discussed with the patient / family include symptom assessment and management and medication review.

## 2024-12-11 ENCOUNTER — TELEPHONE (OUTPATIENT)
Dept: FAMILY MEDICINE CLINIC | Facility: CLINIC | Age: 34
End: 2024-12-11

## 2025-01-03 ENCOUNTER — OFFICE VISIT (OUTPATIENT)
Dept: FAMILY MEDICINE CLINIC | Facility: CLINIC | Age: 35
End: 2025-01-03

## 2025-01-03 VITALS
DIASTOLIC BLOOD PRESSURE: 92 MMHG | SYSTOLIC BLOOD PRESSURE: 148 MMHG | HEIGHT: 68 IN | WEIGHT: 190 LBS | RESPIRATION RATE: 18 BRPM | HEART RATE: 79 BPM | BODY MASS INDEX: 28.79 KG/M2 | TEMPERATURE: 98.9 F | OXYGEN SATURATION: 97 %

## 2025-01-03 DIAGNOSIS — R51.9 ACUTE INTRACTABLE HEADACHE, UNSPECIFIED HEADACHE TYPE: Primary | ICD-10-CM

## 2025-01-03 DIAGNOSIS — R11.2 NAUSEA AND VOMITING, UNSPECIFIED VOMITING TYPE: ICD-10-CM

## 2025-01-03 DIAGNOSIS — H66.90 ACUTE OTITIS MEDIA, UNSPECIFIED OTITIS MEDIA TYPE: ICD-10-CM

## 2025-01-03 PROCEDURE — 99214 OFFICE O/P EST MOD 30 MIN: CPT

## 2025-01-03 RX ORDER — DOXYCYCLINE HYCLATE 100 MG
100 TABLET ORAL 2 TIMES DAILY
Qty: 14 TABLET | Refills: 0 | Status: SHIPPED | OUTPATIENT
Start: 2025-01-03 | End: 2025-01-10

## 2025-01-03 RX ORDER — FAMOTIDINE 20 MG/1
20 TABLET, FILM COATED ORAL 2 TIMES DAILY
Qty: 30 TABLET | Refills: 0 | Status: SHIPPED | OUTPATIENT
Start: 2025-01-03 | End: 2025-01-18

## 2025-01-03 RX ORDER — ONDANSETRON 4 MG/1
4 TABLET, FILM COATED ORAL EVERY 8 HOURS PRN
Qty: 20 TABLET | Refills: 0 | Status: SHIPPED | OUTPATIENT
Start: 2025-01-03

## 2025-01-03 RX ORDER — ACETAMINOPHEN 500 MG
500 TABLET ORAL EVERY 6 HOURS PRN
Qty: 30 TABLET | Refills: 0 | Status: SHIPPED | OUTPATIENT
Start: 2025-01-03

## 2025-01-03 NOTE — LETTER
January 3, 2025     Patient: Elmer Carrero  YOB: 1990  Date of Visit: 1/3/2025      To Whom it May Concern:    Elmer Carrero is under my professional care. Elmer was seen in my office on 1/3/2025. Please excuse him from work on 1/1/25, 1/3/25, 1/4/25, and 1/5/25. Elmer may return to work on 1/6/25 .    If you have any questions or concerns, please don't hesitate to call.         Sincerely,          Sonal Naidu PA-C        CC: No Recipients

## 2025-01-03 NOTE — PROGRESS NOTES
Name: Emler Carrero      : 1990      MRN: [id number]  Encounter Provider: Sonal Naidu PA-C  Encounter Date: 1/3/2025   Encounter department: [facility] CARE  :  Assessment & Plan  Acute intractable headache, unspecified headache type  -Advil not working for him at home  -Will try Tylenol 500 mg every 6 hours as he needs it   Orders:  •  acetaminophen (TYLENOL) 500 mg tablet; Take 1 tablet (500 mg total) by mouth every 6 (six) hours as needed for mild pain    Nausea and vomiting, unspecified vomiting type  -Ordered Pepcid and Zofran as needed for nausea and vomiting   -Told to orally rehydrate with fluids  -Advised to eat light bland foods and try BRAT diet   Orders:  •  famotidine (PEPCID) 20 mg tablet; Take 1 tablet (20 mg total) by mouth 2 (two) times a day for 15 days  •  ondansetron (ZOFRAN) 4 mg tablet; Take 1 tablet (4 mg total) by mouth every 8 (eight) hours as needed for nausea or vomiting    Acute otitis media, unspecified otitis media type  -Both TM's and canal were red and irritated on PE  -Patient did not want to take Penicillins due to wife having an anaphylactic allergy to them  -Will order him doxycycline as alternative  -Told him to take with food and probiotic to avoid further GI upset    -Can come back as needed or if not improving/worsening   Orders:  •  doxycycline hyclate (VIBRA-TABS) 100 mg tablet; Take 1 tablet (100 mg total) by mouth 2 (two) times a day for 7 days           History of Present Illness {?Quick Links Encounters * My Last Note * Last Note in Specialty * Snapshot * Since Last Visit * History :50504}    Patient is a 34-year-old male who presents today for sick visit. He states that he has been sick since 24 with body aches, chills, sore throat, N/V, headache, dry cough and congestion. He states he thinks he had a fever at home although he did not check his temperature at home. He has tried Advil for his headaches and OTC cold and flu.  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       Fever  Associated symptoms include chills, congestion, coughing, fatigue, a fever, headaches, nausea, a sore throat and vomiting. Pertinent negatives include no abdominal pain, arthralgias, chest pain or rash.     Review of Systems   Constitutional:  Positive for chills, fatigue and fever.   HENT:  Positive for congestion and sore throat. Negative for ear pain, rhinorrhea, sinus pressure and sinus pain.    Eyes:  Negative for pain.   Respiratory:  Positive for cough. Negative for chest tightness and shortness of breath.    Cardiovascular:  Negative for chest pain and palpitations.   Gastrointestinal:  Positive for nausea and vomiting. Negative for abdominal pain, constipation and diarrhea.   Genitourinary:  Negative for difficulty urinating and dysuria.   Musculoskeletal:  Negative for arthralgias and back pain.   Skin:  Negative for color change and rash.   Neurological:  Positive for headaches. Negative for syncope.   All other systems reviewed and are negative.      Objective {?Quick Links Trend Vitals * Enter New Vitals * Results Review * Timeline (Adult) * Labs * Imaging * Cardiology * Procedures * Lung Cancer Screening * Surgical eConsent :94687}  /92 (Patient Position: Sitting, Cuff Size: Standard)   Pulse 79   Temp 98.9 °F (37.2 °C) (Tympanic)   Resp 18   Ht 5' 7.53\" (1.715 m)   Wt 86.2 kg (190 lb)   SpO2 97%   BMI 29.29 kg/m²      Physical Exam  Vitals and nursing note reviewed.   Constitutional:       General: He is not in acute distress.     Appearance: Normal appearance. He is well-developed.   HENT:      Head: Normocephalic and atraumatic.      Right Ear: Tympanic membrane is erythematous and bulging.      Left Ear: Tympanic membrane is erythematous and bulging.      Nose: Congestion present.      Mouth/Throat:      Mouth: Mucous membranes are moist.      Pharynx: " Posterior oropharyngeal erythema present.   Eyes:      Conjunctiva/sclera: Conjunctivae normal.   Cardiovascular:      Rate and Rhythm: Normal rate and regular rhythm.      Heart sounds: Normal heart sounds. No murmur heard.  Pulmonary:      Effort: Pulmonary effort is normal. No respiratory distress.      Breath sounds: Normal breath sounds. No wheezing or rhonchi.   Abdominal:      Palpations: Abdomen is soft.      Tenderness: There is no abdominal tenderness.   Musculoskeletal:         General: No swelling.      Cervical back: Neck supple.   Skin:     General: Skin is warm and dry.      Capillary Refill: Capillary refill takes less than 2 seconds.   Neurological:      Mental Status: He is alert and oriented to person, place, and time.   Psychiatric:         Mood and Affect: Mood normal.         Behavior: Behavior normal.         Thought Content: Thought content normal.         Judgment: Judgment normal.       Administrative Statements {?Quick Links Full Problem List * Level of Service * [facility]:62937}  I have spent a total time of 35 minutes in caring for this patient on the day of the visit/encounter including Diagnostic results, Prognosis, Risks and benefits of tx options, Instructions for management, Patient and family education, Importance of tx compliance, Risk factor reductions, Impressions, Counseling / Coordination of care, Documenting in the medical record, Reviewing / ordering tests, medicine, procedures  , and Obtaining or reviewing history  . Topics discussed with the patient / family include symptom assessment and management and medication review.

## 2025-02-01 ENCOUNTER — APPOINTMENT (OUTPATIENT)
Dept: RADIOLOGY | Facility: CLINIC | Age: 35
End: 2025-02-01
Payer: COMMERCIAL

## 2025-02-01 ENCOUNTER — OFFICE VISIT (OUTPATIENT)
Dept: URGENT CARE | Facility: CLINIC | Age: 35
End: 2025-02-01
Payer: COMMERCIAL

## 2025-02-01 VITALS
TEMPERATURE: 97.1 F | HEART RATE: 85 BPM | SYSTOLIC BLOOD PRESSURE: 131 MMHG | DIASTOLIC BLOOD PRESSURE: 89 MMHG | RESPIRATION RATE: 12 BRPM | OXYGEN SATURATION: 98 %

## 2025-02-01 DIAGNOSIS — M54.6 ACUTE MIDLINE THORACIC BACK PAIN: ICD-10-CM
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
## 2025-02-01 DIAGNOSIS — M25.511 ACUTE PAIN OF BOTH SHOULDERS: ICD-10-CM

## 2025-02-01 DIAGNOSIS — J06.9 VIRAL URI WITH COUGH: Primary | ICD-10-CM

## 2025-02-01 DIAGNOSIS — M25.512 ACUTE PAIN OF BOTH SHOULDERS: ICD-10-CM

## 2025-02-01 DIAGNOSIS — S20.222A CONTUSION OF LEFT SIDE OF BACK, INITIAL ENCOUNTER: ICD-10-CM

## 2025-02-01 DIAGNOSIS — S20.221A CONTUSION OF RIGHT SIDE OF BACK, INITIAL ENCOUNTER: ICD-10-CM

## 2025-02-01 PROCEDURE — 87636 SARSCOV2 & INF A&B AMP PRB: CPT | Performed by: PHYSICIAN ASSISTANT

## 2025-02-01 PROCEDURE — 72072 X-RAY EXAM THORAC SPINE 3VWS: CPT

## 2025-02-01 PROCEDURE — 99214 OFFICE O/P EST MOD 30 MIN: CPT | Performed by: PHYSICIAN ASSISTANT

## 2025-02-01 PROCEDURE — 73030 X-RAY EXAM OF SHOULDER: CPT

## 2025-02-01 NOTE — LETTER
February 1, 2025     Patient: Elmer Carrero   YOB: 1990   Date of Visit: 2/1/2025       To Whom it May Concern:    Elmer Carrero was seen in my clinic on 2/1/2025. He may return to work on 2/3/2025 .    If you have any questions or concerns, please don't hesitate to call.         Sincerely,          Eleni Johnson PA-C        CC: No Recipients

## 2025-02-01 NOTE — PATIENT INSTRUCTIONS
Recommended testing for COVID/flu.  Advised patient that his symptoms appear viral at this time.  An antibiotic would not be indicated at this time.  Continue with over-the-counter medication as needed for symptomatic management.    Xray of the thoracic spine provider read- no acute findings identified.  Shadowing of the left humerus/shoulder appears slightly abnormal in films.  Decided to get bilateral shoulder x-rays to make sure we are not missing any other fracture.    Xray of the bilateral shoulders provider read- no acute findings identified.      Advised he likely has a contusion of his back and in the muscles of the back.  Use ice and over-the-counter medication as needed for pain.      Follow up with PCP in 3-5 days.  Proceed to  ER if symptoms worsen.    If tests are performed, our office will contact you with results only if changes need to made to the care plan discussed with you at the visit. You can review your full results on [facility].

## 2025-02-01 NOTE — PROGRESS NOTES
[facility]        NAME: Elmer Carrero is a 34 y.o. male  : 1990    MRN: [id number]  DATE: 2025  TIME: 9:56 AM    Assessment and Plan   Viral URI with cough [J06.9]  1. Viral URI with cough  Covid/Flu-Office Collect      2. Acute midline thoracic back pain  XR spine thoracic 3 vw      3. Contusion of left side of back, initial encounter        4. Contusion of right side of back, initial encounter        5. Acute pain of both shoulders  XR shoulder 2+ vw right    XR shoulder 2+ vw left      6. Fall, initial encounter              Patient Instructions     Patient Instructions   Recommended testing for COVID/flu.  Advised patient that his symptoms appear viral at this time.  An antibiotic would not be indicated at this time.  Continue with over-the-counter medication as needed for symptomatic management.    Xray of the thoracic spine provider read- no acute findings identified.  Shadowing of the left humerus/shoulder appears slightly abnormal in films.  Decided to get bilateral shoulder x-rays to make sure we are not missing any other fracture.    Xray of the bilateral shoulders provider read- no acute findings identified.      Advised he likely has a contusion of his back and in the muscles of the back.  Use ice and over-the-counter medication as needed for pain.      Follow up with PCP in 3-5 days.  Proceed to  ER if symptoms worsen.    If tests are performed, our office will contact you with results only if changes need to made to the care plan discussed with you at the visit. You can review your full results on [facility].      Chief Complaint     Chief Complaint   Patient presents with    Cold Like Symptoms     Yesterday was scratchy in throat with runny nose. Today a little better, was leaving for work this morning and slipped on stairs and now with shoulder pain. Has been sick on and off since New years martine.         History of Present Illness       Patient presents today for  evaluation of a scratchy throat and runny nose that started yesterday.  He has been gradually getting better with those symptoms.  He has been sick on and off since New Year's Kim.  He states that his congestion and cough and ear pain have been waxing and waning for the past several weeks.  Now his nose really started running in his throat, scratchy yesterday.  However he states that today while getting ready to leave for work he slipped on some stairs and now has some pain around his shoulder blades.  He was wearing a big padded jacket.  He states it hurts to sit upright due to the pain right around his spine and scapula's.  He does not really have pain into the shoulder or arms it is mainly around his back- In the mid to upper area.  He denies any numbness or tingling.        Review of Systems   Review of Systems   Constitutional:  Negative for fatigue and fever.   HENT:  Positive for postnasal drip, rhinorrhea and sore throat.    All other systems reviewed and are negative.        Current Medications       Current Outpatient Medications:     acetaminophen (TYLENOL) 500 mg tablet, Take 1 tablet (500 mg total) by mouth every 6 (six) hours as needed for mild pain, Disp: 30 tablet, Rfl: 0    albuterol (Proventil HFA) 90 mcg/act inhaler, Inhale 2 puffs every 6 (six) hours as needed for wheezing, Disp: 6.7 g, Rfl: 1    cetirizine (ZyrTEC) 10 mg tablet, Take 1 tablet (10 mg total) by mouth daily, Disp: 10 tablet, Rfl: 0    Zoryve 0.3 % CREA, , Disp: , Rfl:     EPINEPHrine (EPIPEN) 0.3 mg/0.3 mL SOAJ, Inject 0.3 mL (0.3 mg total) into a muscle once for 1 dose, Disp: 6 each, Rfl: 0    famotidine (PEPCID) 20 mg tablet, Take 1 tablet (20 mg total) by mouth 2 (two) times a day for 15 days, Disp: 30 tablet, Rfl: 0    ondansetron (ZOFRAN) 4 mg tablet, Take 1 tablet (4 mg total) by mouth every 8 (eight) hours as needed for nausea or vomiting (Patient not taking: Reported on 2/1/2025), Disp: 20 tablet, Rfl: 0    predniSONE 20 mg  tablet, Take 2 tablets (40 mg total) by mouth daily Do not start before November 13, 2024. (Patient not taking: Reported on 2/1/2025), Disp: 10 tablet, Rfl: 0    Current Allergies     Allergies as of 02/01/2025 - Reviewed 02/01/2025   Allergen Reaction Noted    Bee venom Swelling 10/24/2024    Shellfish-derived products - food allergy Rash 10/24/2024            The following portions of the patient's history were reviewed and updated as appropriate: allergies, current medications, past family history, past medical history, past social history, past surgical history and problem list.     Past Medical History:   Diagnosis Date    IBS (irritable bowel syndrome)     Testicular cancer (HCC)        Past Surgical History:   Procedure Laterality Date    SURGERY SCROTAL / TESTICULAR Left     testicular removal       History reviewed. No pertinent family history.      Medications have been verified.        Objective   /89   Pulse 85   Temp (!) 97.1 °F (36.2 °C)   Resp 12   SpO2 98%        Physical Exam     Physical Exam  Vitals and nursing note reviewed.   Constitutional:       Appearance: Normal appearance.   HENT:      Right Ear: Tympanic membrane, ear canal and external ear normal.      Left Ear: Tympanic membrane, ear canal and external ear normal.      Nose: Congestion and rhinorrhea present.      Mouth/Throat:      Mouth: Mucous membranes are moist.      Pharynx: No oropharyngeal exudate or posterior oropharyngeal erythema.   Cardiovascular:      Rate and Rhythm: Normal rate and regular rhythm.   Pulmonary:      Effort: Pulmonary effort is normal.      Breath sounds: Normal breath sounds. No wheezing or rhonchi.   Musculoskeletal:      Right shoulder: Normal.      Left shoulder: Normal.      Cervical back: Normal.      Thoracic back: Tenderness and bony tenderness present. No swelling. Decreased range of motion.   Skin:     General: Skin is warm and dry.   Neurological:      General: No focal deficit present.       Mental Status: He is alert and oriented to person, place, and time.   Psychiatric:         Mood and Affect: Mood normal.         Behavior: Behavior normal.

## 2025-02-03 LAB
FLUAV RNA RESP QL NAA+PROBE: NEGATIVE
FLUBV RNA RESP QL NAA+PROBE: NEGATIVE
SARS-COV-2 RNA RESP QL NAA+PROBE: NEGATIVE

## 2025-03-06 ENCOUNTER — APPOINTMENT (OUTPATIENT)
Dept: URGENT CARE | Facility: MEDICAL CENTER | Age: 35
End: 2025-03-06

## 2025-06-30 ENCOUNTER — HOSPITAL ENCOUNTER (EMERGENCY)
Facility: HOSPITAL | Age: 35
Discharge: HOME | End: 2025-06-30
Attending: EMERGENCY MEDICINE | Admitting: EMERGENCY MEDICINE
Payer: COMMERCIAL

## 2025-06-30 ENCOUNTER — APPOINTMENT (OUTPATIENT)
Dept: ULTRASOUND IMAGING | Facility: HOSPITAL | Age: 35
End: 2025-06-30
Payer: COMMERCIAL

## 2025-06-30 VITALS
RESPIRATION RATE: 14 BRPM | DIASTOLIC BLOOD PRESSURE: 76 MMHG | SYSTOLIC BLOOD PRESSURE: 131 MMHG | HEIGHT: 68 IN | OXYGEN SATURATION: 97 % | HEART RATE: 74 BPM | TEMPERATURE: 98.8 F | BODY MASS INDEX: 27.46 KG/M2 | WEIGHT: 181.2 LBS

## 2025-06-30 DIAGNOSIS — N50.819 TESTICLE PAIN: Primary | ICD-10-CM

## 2025-06-30 LAB
BILIRUB UR QL STRIP: NEGATIVE
CLARITY UR: CLEAR
COLOR UR: NORMAL
GLUCOSE UR STRIP-MCNC: NEGATIVE MG/DL
HGB UR QL STRIP.AUTO: NEGATIVE
KETONES UR STRIP-MCNC: NEGATIVE MG/DL
LEUKOCYTE ESTERASE UR QL STRIP: NEGATIVE
NITRITE UR QL STRIP: NEGATIVE
PH UR STRIP.AUTO: 7.5 [PH]
PROT UR STRIP-MCNC: NEGATIVE MG/DL
SP GR UR STRIP.AUTO: 1.01 (ref 1–1.03)
UROBILINOGEN UR STRIP-ACNC: <2 MG/DL

## 2025-06-30 PROCEDURE — 99284 EMERGENCY DEPT VISIT MOD MDM: CPT | Performed by: EMERGENCY MEDICINE

## 2025-06-30 PROCEDURE — 81003 URINALYSIS AUTO W/O SCOPE: CPT | Performed by: EMERGENCY MEDICINE

## 2025-06-30 PROCEDURE — 76870 US EXAM SCROTUM: CPT

## 2025-06-30 PROCEDURE — 99284 EMERGENCY DEPT VISIT MOD MDM: CPT

## 2025-06-30 NOTE — ED PROVIDER NOTES
Time reflects when diagnosis was documented in both MDM as applicable and the Disposition within this note       Time User Action Codes Description Comment    6/30/2025  4:44 PM Mat Jeff Add [N50.819] Testicle pain           ED Disposition       ED Disposition   Discharge    Condition   Stable    Date/Time   Mon Jun 30, 2025  4:44 PM    Comment   Elmer Carrero discharge to home/self care.                   Assessment & Plan       Medical Decision Making  Problems Addressed:  Testicle pain: complicated acute illness or injury     Details: Ddx includes malignancy, torsion, orchitis, epididymitis, ureteral calculus, etc.     Amount and/or Complexity of Data Reviewed  Labs: ordered.             Medications - No data to display    ED Risk Strat Scores                    No data recorded        SBIRT 20yo+      Flowsheet Row Most Recent Value   Initial Alcohol Screen: US AUDIT-C     1. How often do you have a drink containing alcohol? 0 Filed at: 06/30/2025 1350   2. How many drinks containing alcohol do you have on a typical day you are drinking?  0 Filed at: 06/30/2025 1350   3a. Male UNDER 65: How often do you have five or more drinks on one occasion? 0 Filed at: 06/30/2025 1350   Audit-C Score 0 Filed at: 06/30/2025 1350   DYANA: How many times in the past year have you...    Used an illegal drug or used a prescription medication for non-medical reasons? Never Filed at: 06/30/2025 1350                            History of Present Illness       Chief Complaint   Patient presents with    Testicle Pain     Pt states he woke up this morning at 1030 this morning with pain in his R testicle. Denies swelling, denies injury. Pain started as 6/10 pain and is now 2/10 pain.        Past Medical History[1]   Past Surgical History[2]   Family History[3]   Social History[4]   E-Cigarette/Vaping    E-Cigarette Use Never User       E-Cigarette/Vaping Substances      I have reviewed and agree with the history as documented.      Atraumatic testicle pain since this morning. Still present but improved. No clear precipitant. H/o contralateral testicular malignancy s/p orchiectomy. No fever or chills, dysuria, urgency, frequency, hematuria. No abd pain or back pain.         Review of Systems   Constitutional:  Negative for chills and fever.   Genitourinary:  Positive for testicular pain. Negative for decreased urine volume, dysuria, enuresis, flank pain, frequency, genital sores, hematuria, penile discharge, penile pain, penile swelling, scrotal swelling and urgency.   Musculoskeletal:  Negative for back pain.           Objective       ED Triage Vitals [06/30/25 1347]   Temperature Pulse Blood Pressure Respirations SpO2 Patient Position - Orthostatic VS   98.8 °F (37.1 °C) 74 131/76 14 97 % Sitting      Temp Source Heart Rate Source BP Location FiO2 (%) Pain Score    Oral Monitor Left arm -- --      Vitals      Date and Time Temp Pulse SpO2 Resp BP Pain Score FACES Pain Rating User   06/30/25 1347 98.8 °F (37.1 °C) 74 97 % 14 131/76 -- -- JW            Physical Exam  Vitals and nursing note reviewed.   Constitutional:       General: He is not in acute distress.     Appearance: Normal appearance. He is well-developed. He is not ill-appearing, toxic-appearing or diaphoretic.   HENT:      Head: Normocephalic and atraumatic.      Mouth/Throat:      Mouth: Mucous membranes are moist.      Pharynx: Oropharynx is clear.     Eyes:      General:         Right eye: No discharge.         Left eye: No discharge.      Conjunctiva/sclera: Conjunctivae normal.      Pupils: Pupils are equal, round, and reactive to light.     Neck:      Vascular: No JVD.     Cardiovascular:      Pulses: Normal pulses.   Pulmonary:      Effort: Pulmonary effort is normal. No respiratory distress.      Breath sounds: No stridor.   Genitourinary:     Comments: Declines exam due to hallway status and completion of u/s without evidence of malignancy, requests d/c home at time of  evaluation.     Musculoskeletal:         General: No deformity. Normal range of motion.      Cervical back: Normal range of motion and neck supple. No rigidity.     Skin:     General: Skin is warm and dry.      Capillary Refill: Capillary refill takes less than 2 seconds.      Coloration: Skin is not pale.      Findings: No erythema or rash.     Neurological:      General: No focal deficit present.      Mental Status: He is alert and oriented to person, place, and time.      Cranial Nerves: No cranial nerve deficit.      Sensory: No sensory deficit.      Motor: No weakness or abnormal muscle tone.      Coordination: Coordination normal.      Gait: Gait normal.         Results Reviewed       Procedure Component Value Units Date/Time    UA (URINE) with reflex to Scope [[id number]] Collected: 06/30/25 1638    Lab Status: Final result Specimen: Urine, Clean Catch Updated: 06/30/25 1647     Color, UA Light Yellow     Clarity, UA Clear     Specific Gravity, UA 1.012     pH, UA 7.5     Leukocytes, UA Negative     Nitrite, UA Negative     Protein, UA Negative mg/dl      Glucose, UA Negative mg/dl      Ketones, UA Negative mg/dl      Urobilinogen, UA <2.0 mg/dl      Bilirubin, UA Negative     Occult Blood, UA Negative            US scrotum and testicles   Final Interpretation by Christian Beck MD (06/30 1603)      Normal right testis. Left orchiectomy.      Workstation performed: [id number]             Procedures    ED Medication and Procedure Management   Prior to Admission Medications   Prescriptions Last Dose Informant Patient Reported? Taking?   EPINEPHrine (EPIPEN) 0.3 mg/0.3 mL SOAJ   No No   Sig: Inject 0.3 mL (0.3 mg total) into a muscle once for 1 dose   Zoryve 0.3 % CREA   Yes No   acetaminophen (TYLENOL) 500 mg tablet   No No   Sig: Take 1 tablet (500 mg total) by mouth every 6 (six) hours as needed for mild pain   albuterol (Proventil HFA) 90 mcg/act inhaler   No No   Sig: Inhale 2 puffs every 6 (six) hours as  needed for wheezing   cetirizine (ZyrTEC) 10 mg tablet   No No   Sig: Take 1 tablet (10 mg total) by mouth daily   famotidine (PEPCID) 20 mg tablet   No No   Sig: Take 1 tablet (20 mg total) by mouth 2 (two) times a day for 15 days   ondansetron (ZOFRAN) 4 mg tablet   No No   Sig: Take 1 tablet (4 mg total) by mouth every 8 (eight) hours as needed for nausea or vomiting   Patient not taking: Reported on 2/1/2025   predniSONE 20 mg tablet   No No   Sig: Take 2 tablets (40 mg total) by mouth daily Do not start before November 13, 2024.   Patient not taking: Reported on 2/1/2025      Facility-Administered Medications: None     Discharge Medication List as of 6/30/2025  4:46 PM        CONTINUE these medications which have NOT CHANGED    Details   acetaminophen (TYLENOL) 500 mg tablet Take 1 tablet (500 mg total) by mouth every 6 (six) hours as needed for mild pain, Starting Fri 1/3/2025, Normal      albuterol (Proventil HFA) 90 mcg/act inhaler Inhale 2 puffs every 6 (six) hours as needed for wheezing, Starting Tue 11/26/2024, Normal      cetirizine (ZyrTEC) 10 mg tablet Take 1 tablet (10 mg total) by mouth daily, Starting Mon 11/25/2024, Normal      EPINEPHrine (EPIPEN) 0.3 mg/0.3 mL SOAJ Inject 0.3 mL (0.3 mg total) into a muscle once for 1 dose, Starting Tue 12/3/2024, Normal      famotidine (PEPCID) 20 mg tablet Take 1 tablet (20 mg total) by mouth 2 (two) times a day for 15 days, Starting Fri 1/3/2025, Until Sat 1/18/2025, Normal      ondansetron (ZOFRAN) 4 mg tablet Take 1 tablet (4 mg total) by mouth every 8 (eight) hours as needed for nausea or vomiting, Starting Fri 1/3/2025, Normal      predniSONE 20 mg tablet Take 2 tablets (40 mg total) by mouth daily Do not start before November 13, 2024., Starting Wed 11/13/2024, Normal      Zoryve 0.3 % CREA Historical Med           No discharge procedures on file.  ED SEPSIS DOCUMENTATION   Time reflects when diagnosis was documented in both MDM as applicable and the  Disposition within this note       Time User Action Codes Description Comment    6/30/2025  4:44 PM Mat Jeff Add [N50.819] Testicle pain                    [1]   Past Medical History:  Diagnosis Date    IBS (irritable bowel syndrome)     Testicular cancer (HCC)    [2]   Past Surgical History:  Procedure Laterality Date    SURGERY SCROTAL / TESTICULAR Left     testicular removal   [3] No family history on file.  [4]   Social History  Tobacco Use    Smoking status: Every Day     Types: Cigarettes     Start date: 2012    Smokeless tobacco: Never   Vaping Use    Vaping status: Never Used   Substance Use Topics    Alcohol use: Not Currently    Drug use: Not Currently        Mat Jeff MD  06/30/25 8057

## 2025-06-30 NOTE — Clinical Note
Elmer Crarero was seen and treated in our emergency department on 6/30/2025.                Diagnosis:     Elmer  may return to work on return date.    He may return on this date: 07/01/2025         If you have any questions or concerns, please don't hesitate to call.      Mat Jeff MD    ______________________________           _______________          _______________  Hospital Representative                              Date                                Time START Ozempic 0.25 mg weekly

## 2025-06-30 NOTE — Clinical Note
Elmer Carrero was seen and treated in our emergency department on 6/30/2025.                Diagnosis:     Elmer  may return to work on return date.    He may return on this date: 07/02/2025         If you have any questions or concerns, please don't hesitate to call.      Mat Jeff MD    ______________________________           _______________          _______________  Hospital Representative                              Date                                Time